# Patient Record
Sex: FEMALE | Race: WHITE
[De-identification: names, ages, dates, MRNs, and addresses within clinical notes are randomized per-mention and may not be internally consistent; named-entity substitution may affect disease eponyms.]

---

## 2018-03-29 ENCOUNTER — HOSPITAL ENCOUNTER (OUTPATIENT)
Dept: HOSPITAL 92 - RAD | Age: 78
Discharge: HOME | End: 2018-03-29
Attending: INTERNAL MEDICINE
Payer: MEDICARE

## 2018-03-29 DIAGNOSIS — R06.00: Primary | ICD-10-CM

## 2018-03-29 PROCEDURE — 71046 X-RAY EXAM CHEST 2 VIEWS: CPT

## 2018-03-29 NOTE — RAD
CHEST TWO VIEWS:

 

History: Dyspnea. 

 

Comparison: 10-20-12

 

FINDINGS: 

Heart size is within normal limits. There are arthrosclerotic changes of the aorta. Lungs are clear o
f any infiltrative process. Bones appear somewhat demineralized. 

 

IMPRESSION: 

No active intrathoracic disease. Stable chest. 

 

POS: C